# Patient Record
Sex: FEMALE | Race: BLACK OR AFRICAN AMERICAN | NOT HISPANIC OR LATINO | Employment: UNEMPLOYED | URBAN - METROPOLITAN AREA
[De-identification: names, ages, dates, MRNs, and addresses within clinical notes are randomized per-mention and may not be internally consistent; named-entity substitution may affect disease eponyms.]

---

## 2017-06-05 ENCOUNTER — HOSPITAL ENCOUNTER (EMERGENCY)
Facility: OTHER | Age: 22
Discharge: HOME | End: 2017-06-06
Attending: EMERGENCY MEDICINE
Payer: MEDICAID

## 2017-06-05 DIAGNOSIS — R51.9 NONINTRACTABLE HEADACHE, UNSPECIFIED CHRONICITY PATTERN, UNSPECIFIED HEADACHE TYPE: ICD-10-CM

## 2017-06-05 DIAGNOSIS — J06.9 VIRAL URI: Primary | ICD-10-CM

## 2017-06-05 DIAGNOSIS — N93.9 VAGINAL SPOTTING: ICD-10-CM

## 2017-06-05 PROCEDURE — 81025 URINE PREGNANCY TEST: CPT | Performed by: EMERGENCY MEDICINE

## 2017-06-05 PROCEDURE — 99284 EMERGENCY DEPT VISIT MOD MDM: CPT | Mod: 25

## 2017-06-06 VITALS
OXYGEN SATURATION: 98 % | SYSTOLIC BLOOD PRESSURE: 146 MMHG | HEIGHT: 67 IN | WEIGHT: 270 LBS | TEMPERATURE: 98 F | BODY MASS INDEX: 42.38 KG/M2 | DIASTOLIC BLOOD PRESSURE: 74 MMHG | HEART RATE: 86 BPM

## 2017-06-06 LAB
B-HCG UR QL: POSITIVE
BILIRUB UR QL STRIP: NEGATIVE
CLARITY UR: CLEAR
COLOR UR: YELLOW
CTP QC/QA: YES
GLUCOSE UR QL STRIP: NEGATIVE
HGB UR QL STRIP: ABNORMAL
KETONES UR QL STRIP: NEGATIVE
LEUKOCYTE ESTERASE UR QL STRIP: NEGATIVE
NITRITE UR QL STRIP: NEGATIVE
PH UR STRIP: 6 [PH] (ref 5–8)
PROT UR QL STRIP: NEGATIVE
SP GR UR STRIP: 1.01 (ref 1–1.03)
URN SPEC COLLECT METH UR: ABNORMAL
UROBILINOGEN UR STRIP-ACNC: NEGATIVE EU/DL

## 2017-06-06 PROCEDURE — 25000003 PHARM REV CODE 250: Performed by: PHYSICIAN ASSISTANT

## 2017-06-06 PROCEDURE — 81003 URINALYSIS AUTO W/O SCOPE: CPT

## 2017-06-06 PROCEDURE — 81025 URINE PREGNANCY TEST: CPT | Performed by: EMERGENCY MEDICINE

## 2017-06-06 RX ORDER — ACETAMINOPHEN 500 MG
1000 TABLET ORAL
Status: COMPLETED | OUTPATIENT
Start: 2017-06-06 | End: 2017-06-06

## 2017-06-06 RX ADMIN — ACETAMINOPHEN 1000 MG: 500 TABLET ORAL at 12:06

## 2017-06-06 NOTE — ED PROVIDER NOTES
Encounter Date: 6/5/2017       History     Chief Complaint   Patient presents with    Headache     Pt c/o HA since 7am, pt denies taking anything to aleviate symptoms    Vaginal Bleeding     Pt reports she is 10wks pregnant and had mild vaginal spotting today     Review of patient's allergies indicates:   Allergen Reactions    Pcn [penicillins]      Patient is 22-year-old female G1 PE 0 with unknown last menstrual period and 10w4d gestation by ultrasound who presents with complaints of single episode of vaginal spotting and headache today.  She reports headache was gradual in onset and has been persistent throughout the day.  She reports associated nasal congestion and mild sore throat and occasional cough.  She has not taken any medications to help with her symptoms.  She reports early this afternoon feeling pelvic pressure like she had A bowel movement.  Upon sitting on the toilet she states that she pushed and blood came out.  She reports just a few drips with no recurrence of bleeding.  She reports no abdominal pain following.  Currently she is not experiencing vaginal bleeding.  She did not call her OB/GYN she called 911 and came directly to the emergency department for evaluation.  She currently is experiencing improvement in headache without having taken any medications.  She denies fever, chills, nausea, vomiting, chest pain or shortness of breath.  She is currently unaccompanied in the ER.  Of note all of her previous prenatal care has [facility].          Past Medical History:   Diagnosis Date    Asthma     Lupus      History reviewed. No pertinent surgical history.  History reviewed. No pertinent family history.  Social History   Substance Use Topics    Smoking status: Never Smoker    Smokeless tobacco: Not on file    Alcohol use No     Review of Systems   Constitutional: Negative for fever.   HENT: Positive for congestion and sore throat.    Respiratory: Positive for cough. Negative for  shortness of breath.    Cardiovascular: Negative for chest pain.   Gastrointestinal: Negative for nausea.   Genitourinary: Positive for vaginal bleeding. Negative for dysuria.   Musculoskeletal: Negative for back pain.   Skin: Negative for rash.   Neurological: Positive for headaches. Negative for weakness.   Hematological: Does not bruise/bleed easily.       Physical Exam     Initial Vitals [06/05/17 2223]   BP Pulse Resp Temp SpO2   (!) 146/74 86 -- 99.1 °F (37.3 °C) 98 %     Physical Exam    Nursing note and vitals reviewed.  Constitutional: She appears well-developed and well-nourished. She is not diaphoretic. No distress.   Healthy appearing -American female in no acute distress or obvious pain.  She ambulates around emergency department with ease.  She makes good eye contact, speaks in clear full sentences and is cooperative.   HENT:   Head: Normocephalic and atraumatic.   Boggy nasal turbinates bilaterally  Posterior oropharynx erythema without edema or exudate  Uvula is midline and there is no trismus or lingual elevation  No anterior cervical lymphadenopathy   Eyes: Conjunctivae and EOM are normal. Pupils are equal, round, and reactive to light. Right eye exhibits no discharge. Left eye exhibits no discharge. No scleral icterus.   Neck: Normal range of motion. Neck supple.   Cardiovascular: Normal rate, regular rhythm and normal heart sounds. Exam reveals no gallop and no friction rub.    No murmur heard.  Pulmonary/Chest: Breath sounds normal. She has no wheezes. She has no rhonchi. She has no rales.   Abdominal: Soft. Bowel sounds are normal. There is no tenderness. There is no rebound and no guarding.   Benign abdomen.  No CVA tenderness to percussion.   Genitourinary:   Genitourinary Comments: Pelvic exam is deferred by patient   Musculoskeletal: Normal range of motion. She exhibits no edema or tenderness.   Lymphadenopathy:     She has no cervical adenopathy.   Neurological: She is alert and  oriented to person, place, and time. She has normal strength. No cranial nerve deficit or sensory deficit.   No cranial nerve deficits  No gait abnormalities   Skin: Skin is warm and dry. Capillary refill takes less than 2 seconds. No rash and no abscess noted. No erythema.   Psychiatric: She has a normal mood and affect. Her behavior is normal. Thought content normal.         ED Course   Procedures  Labs Reviewed   URINALYSIS   POCT URINE PREGNANCY             Medical Decision Making:   ED Management:  Urgent evaluation of 22-year-old female who presents with complaints most consistent with upper respiratory infection and sinus headache with on associated vaginal spotting.  She is afebrile, nontoxic appearing, hemodynamically stable.  Physical exam reveals benign abdomen with normal cardiopulmonary auscultation and no focal neuro deficits.  Patient refuses pelvic exam and she verbalizes understanding for the limitation she is placing on this workup.  I did review medical records from [facility] which revealed on May 10 patient was documented to have blood type is B+ beta [place] of [address] with a transvaginal ultrasound confirming intrauterine pregnancy to be 6 weeks and 3 days.  At this point patient tells me she is not currently bleeding and I do feel that she is safe for discharge without diagnostic lab values especially in the setting of stable vital signs and no evidence of acute anemia or dehydration.  I suspect her headache is related to HEENT inflammation likely of viral etiology.  Do not suspect acute bacterial infection requiring antibiotic treatment.  Transvaginal ultrasound obtained by me does reveal good fetal cardiac activity with heartbeat measured at approximately 148 bpm.  There is good spontaneous fetal movement.  I informed patient that this is reassuring  However the importance of follow-up with OB/GYN for complete evaluation is stressed to the patient.  She verbalizes understanding is amenable to plan.   She is placed on bleeding precautions and pelvic rest and is felt safe for discharge.                    ED Course     Clinical Impression:   The primary encounter diagnosis was Viral URI. Diagnoses of Nonintractable headache, unspecified chronicity pattern, unspecified headache type and Vaginal spotting were also pertinent to this visit.          Verna Clemente PA-C  06/06/17 0124

## 2019-09-21 ENCOUNTER — HOSPITAL ENCOUNTER (EMERGENCY)
Facility: OTHER | Age: 24
Discharge: HOME | End: 2019-09-21
Attending: EMERGENCY MEDICINE
Payer: MEDICAID

## 2019-09-21 VITALS
DIASTOLIC BLOOD PRESSURE: 75 MMHG | HEIGHT: 66 IN | BODY MASS INDEX: 47.09 KG/M2 | OXYGEN SATURATION: 98 % | SYSTOLIC BLOOD PRESSURE: 135 MMHG | TEMPERATURE: 98 F | WEIGHT: 293 LBS | RESPIRATION RATE: 18 BRPM | HEART RATE: 88 BPM

## 2019-09-21 DIAGNOSIS — M25.572 ACUTE LEFT ANKLE PAIN: Primary | ICD-10-CM

## 2019-09-21 DIAGNOSIS — Z20.2 ENCOUNTER FOR ASSESSMENT OF STD EXPOSURE: ICD-10-CM

## 2019-09-21 DIAGNOSIS — O92.79 PROLONGED LACTATION: ICD-10-CM

## 2019-09-21 DIAGNOSIS — R63.5 WEIGHT GAIN: ICD-10-CM

## 2019-09-21 LAB
B-HCG UR QL: NEGATIVE
BILIRUB UR QL STRIP: NEGATIVE
CLARITY UR: ABNORMAL
COLOR UR: YELLOW
CTP QC/QA: YES
GLUCOSE UR QL STRIP: NEGATIVE
HGB UR QL STRIP: NEGATIVE
KETONES UR QL STRIP: NEGATIVE
LEUKOCYTE ESTERASE UR QL STRIP: NEGATIVE
NITRITE UR QL STRIP: NEGATIVE
PH UR STRIP: 6 [PH] (ref 5–8)
PROT UR QL STRIP: NEGATIVE
SP GR UR STRIP: 1.02 (ref 1–1.03)
URN SPEC COLLECT METH UR: ABNORMAL
UROBILINOGEN UR STRIP-ACNC: NEGATIVE EU/DL

## 2019-09-21 PROCEDURE — 81025 URINE PREGNANCY TEST: CPT | Performed by: EMERGENCY MEDICINE

## 2019-09-21 PROCEDURE — 25000003 PHARM REV CODE 250: Performed by: EMERGENCY MEDICINE

## 2019-09-21 PROCEDURE — 99284 EMERGENCY DEPT VISIT MOD MDM: CPT

## 2019-09-21 PROCEDURE — 87491 CHLMYD TRACH DNA AMP PROBE: CPT

## 2019-09-21 PROCEDURE — 81003 URINALYSIS AUTO W/O SCOPE: CPT

## 2019-09-21 RX ORDER — LIDOCAINE 50 MG/G
PATCH TOPICAL
Qty: 30 PATCH | Refills: 0 | Status: SHIPPED | OUTPATIENT
Start: 2019-09-21 | End: 2020-10-30

## 2019-09-21 RX ORDER — LEVONORGESTREL 1.5 MG/1
1.5 TABLET ORAL ONCE
Qty: 1 TABLET | Refills: 0 | Status: SHIPPED | OUTPATIENT
Start: 2019-09-21 | End: 2020-10-30

## 2019-09-21 RX ORDER — NAPROXEN 500 MG/1
500 TABLET ORAL 2 TIMES DAILY PRN
Qty: 60 TABLET | Refills: 0 | Status: SHIPPED | OUTPATIENT
Start: 2019-09-21 | End: 2020-10-30

## 2019-09-21 RX ORDER — LEVONORGESTREL 1.5 MG/1
1.5 TABLET ORAL ONCE
Qty: 1 TABLET | Refills: 0 | COMMUNITY
Start: 2019-09-21 | End: 2019-09-21 | Stop reason: SDUPTHER

## 2019-09-21 RX ORDER — NAPROXEN 500 MG/1
500 TABLET ORAL
Status: COMPLETED | OUTPATIENT
Start: 2019-09-21 | End: 2019-09-21

## 2019-09-21 RX ADMIN — NAPROXEN 500 MG: 500 TABLET ORAL at 01:09

## 2019-09-21 NOTE — ED TRIAGE NOTES
"Pt on face time upon RN entry. Pt presents with multiple medical complaints. Pt states she injured to left ankle yesterday at work. States she was stepping off a ladder and missed a rung and stumbled back and has pain with bearing weight. No obvious deformities noted. Pt also reports she has unprotected sex with ex boyfriend last night and "wants to get checked out for that". States hx of trichomonas infection from the sex partner in the past. Pt also reports "smelly discharge" with itching for 3 weeks, reports hx of BV and lichen planus. Pt also reports recent weight gain of 60 lbs despite dieting. Pt staets she recently moved here and has not set up primary care. Pt is obese, well appearing otherwise, ptin NAD.   "

## 2019-09-21 NOTE — DISCHARGE INSTRUCTIONS
Call your primary care doctor to make the first available appointment.     Keep all your medical appointments.     Take your regular medication as prescribed. Contact your primary care provider before running out of medication, or for any problems obtaining them.    Do not drive or operate heavy machinery while taking opioid or muscle relaxing medications. Examples include norco, percocet, xanax, valium, flexeril.     Overuse or long term use of pain and sedating medication may lead to addiction, dependence, organ failure, family and work problems, legal problems, accidental overdose and death.    If you do not have health insurance, you probably qualify for heavily discounted rates:  Call [phone] ([facility] hotline) or go to [email or web address]    Your evaluation in the ED does not suggest any emergent or life threatening medical condition requiring admission or immediate intervention beyond that provided in the ED.   However, the signs of a serious problem sometimes take more time to appear.   RETURN TO THE ER if any of the following occur:    Weakness, dizziness, fainting, or loss of consciousness   Fever of 100.4ºF (38ºC) or higher  Any worse symptoms  Any new or concerning symptoms        In the [place] emergency conrtraception is available on the shelf without age restrictions to women and men. Look for Plan B One-Step, Take Action, Next Choice One-Dose, My Way or other generics in the family planning aisle.       Emergency contraceptive pills are stocked by all major pharmacy chains, but be sure to call ahead to make sure that EC is available in your pharmacy. Regulations on the sale of EC have changed frequently, so it can be quite confusing. Below is some information about how the different brands of EC are sold. If you want to use ainsley, call the pharmacy first to be sure that it is in stock.    Progestin-only EC (like Plan B One-Step and its generic forms Take Action, Next Choice One Dose and My Way) are  approved for unrestricted sale on store shelves. Anyone can buy it without needing to show ID. Plan B One-Step usually costs about $50, and the generics cost about $40.    If you want to use insurance to purchase EC, go to the pharmacy counter and ask for help.  You can order a generic form of Plan B One-Step at www.[employer] for $20 + $5 shipping. This site does not offer expedited shipping, so it's not meant for emergency use, but you can stock up and keep it on hand for future use.  ainsley is sold by prescription only, regardless of age. You can also order ainsley online at [employer] or [employer] for $67, including next-day shipping.

## 2019-09-21 NOTE — ED PROVIDER NOTES
"Encounter Date: 9/21/2019    SCRIBE #1 NOTE: I, Laura Blevins, am scribing for, and in the presence of, Dr. Lee.       History     Chief Complaint   Patient presents with    Ankle Pain     L ankle pain after stepping off a ladder    Vaginal Discharge     white vaginal discharge, and itching.      Time seen by provider: 12:23 AM    This is a 24 y.o. female who presents with complaint of left ankle pain s/p stumbling down a 3 ft ladder over 24 hours ago. She was ambulatory afterwards.  Pain is moderate in degree, constant, worse with ambulation and bearing weight.  She reports associated tingling radiating from the ankle to the toes, but denies numbness. She has not taken any analgesics or attempted any other remedy for the pain. She reports remote history of left ankle sprain.    Patient is also concerned for STD. She had intercourse over 24 hours ago after heavily drinking and cannot recall if she used protection.  She feels the encounter was consensual.  My history is different than that at triage as she currently denies any  symptoms including dysuria, vaginal discharge, or vaginal itching. She reports hx of BV. She also reports gaining 60 lbs in 3 months despite dieting with a history of her last several years of very large variations of her way every few months ("I can go from weighing 360 to 180 lbs in 4 months.") she denies any changes in her diet or environment to explain these swings.    The history is provided by the patient and medical records.     Review of patient's allergies indicates:   Allergen Reactions    Pcn [penicillins]      Past Medical History:   Diagnosis Date    Asthma     Lichen planus     Lupus      History reviewed. No pertinent surgical history.  History reviewed. No pertinent family history.  Social History     Tobacco Use    Smoking status: Never Smoker   Substance Use Topics    Alcohol use: No    Drug use: No     Review of Systems  ROS: As per HPI and below:   General: " "Notes weight change. No fever. No chills. No generalized weakness.   HENT: No sore throat. No rhinorrhea. No facial pain. No facial swelling.   Eyes: No visual changes. No eye pain.   Cardiovascular: No chest pain.   Respiratory: No dyspnea. No cough.   GI: No abdominal pain. No nausea. No vomiting. No diarrhea.   : No dysuria. No hematuria.   Skin: No rashes.  Neuro: No focal deficits. No focal weakness. No headache. No syncope.  Endo: Persistent lactation, despite not breastfeeding  Musculoskeletal: Notes left ankle pain. No swelling.    Psych: No acute changes.  All other systems negative.     Physical Exam     Initial Vitals [09/21/19 0005]   BP Pulse Resp Temp SpO2   (!) 147/82 102 16 98.2 °F (36.8 °C) 97 %      MAP       --         Physical Exam  /75   Pulse 88   Temp 97.8 °F (36.6 °C) (Oral)   Resp 18   Ht 5' 6" (1.676 m)   Wt (!) 140.2 kg (309 lb)   SpO2 98%   Breastfeeding? Unknown   BMI 49.87 kg/m²     Nursing note and vitals reviewed.  Constitutional: AAOx3. Well appearing. Obese.   Eyes: EOMI. No discharge. Anicteric.  HENT:   Mouth/Throat: Oropharynx is clear and moist. Uvula midline.   Neck: Normal range of motion. Neck supple.    Cardiovascular: Normal rate  Pulmonary/Chest: No respiratory distress.   Abdominal: No distension   Musculoskeletal: Normal range of motion.   Left Ankle: No edema, skin intact, FROM, no focal tenderness at anterior ankle, no 5th metatarsal tenderness, no focal midfoot tenderness, no tenderness along distal 6cm of tibia or fibula, mild lateral malleolar point tenderness, no medial malleolar point tenderness, no pain on tibial press, no proximal tibial tenderness, neurovascularly intact distally. Compartments soft. No knee joint tenderness.  Able to bear weight. Ipsilateral knee with FROM, no focal tenderness.   Neurological: GCS15. Alert and oriented to person, place, and time. No gross cranial nerve II-XII, focal strength, or light touch deficit. Steady gait. "   Skin: Skin is warm and dry.   Psychiatric: Behavior is normal. Judgment normal.      ED Course   Procedures  Labs Reviewed   URINALYSIS, REFLEX TO URINE CULTURE - Abnormal; Notable for the following components:       Result Value    Appearance, UA Hazy (*)     All other components within normal limits    Narrative:     Preferred Collection Type->Urine, Clean Catch   POCT URINE PREGNANCY - Normal   C. TRACHOMATIS/N. GONORRHOEAE BY AMP DNA          Imaging Results    None          Medical Decision Making:   History:   Old Medical Records: I decided to obtain old medical records.  Clinical Tests:   Lab Tests: Ordered and Reviewed            Scribe Attestation:   Scribe #1: I performed the above scribed service and the documentation accurately describes the services I performed. I attest to the accuracy of the note.    Attending Attestation:           Physician Attestation for Scribe:  Physician Attestation Statement for Scribe #1: I, Dr. Lee, reviewed documentation, as scribed by Laura Blevins in my presence, and it is both accurate and complete.                 ED Course as of Sep 21 2104   Sat Sep 21, 2019   0043 Patient is a 24-year-old female with obesity, who presents for evaluation of left ankle pain after misstep off a ladder 1 day ago.   Patient also concern for possible STD exposure after possible unprotected sex while intoxicated yesterday.  She feels that intercourse close consensual.  Patient is also concerned by very large variations in a week over the last 2 years, including recent approximately 60 lb weight gain in the last few months.  On exam patient morbidly obese, has mild left lateral malleolar tenderness, able to bear weight.   I doubt ankle fracture or dislocation.  Discussed with the patient that testing today from a sexual encounter yesterday would not be diagnostic for any new STDs, however offered her screening for gonorrhea, chlamydia, Trichomonas.  I instructed her to follow up with PCP  for further STD testing.  I also offered the patient a prescription for emergency contraceptive.  Regarding her fluctuating weight, and persistent lactation, I instructed the patient to follow up with PCP, specifically for endocrine testing.  She stated understanding.  I discussed with patient and/or guardian/caretaker that this evaluation in the ED does not suggest any emergent or life threatening medical condition requiring admission or immediate intervention beyond what was provided in the ED. Regardless, an unremarkable evaluation in the ED does not preclude the development or presence of a serious or life threatening condition. As such, patient was instructed to return immediately for any worsening or change in current symptoms.     I had a detailed discussion with patient  and/or guardian/caretaker regarding findings, plan, return precautions, importance of medication adherence, need to follow-up with a PCP and specialist. All questions answered.     Note was created using voice recognition software. It may have occasional typographical errors not identified and edited despite initial review prior to signing.        [RC]      ED Course User Index  [RC] Matt Lee MD     Clinical Impression:     1. Acute left ankle pain    2. Weight gain    3. Encounter for assessment of STD exposure    4. Prolonged lactation                                 Matt Lee MD  09/21/19 1743

## 2019-09-22 LAB
C TRACH DNA SPEC QL NAA+PROBE: NOT DETECTED
N GONORRHOEA DNA SPEC QL NAA+PROBE: NOT DETECTED

## 2020-10-29 DIAGNOSIS — Z34.90 PREGNANCY, UNSPECIFIED GESTATIONAL AGE: Primary | ICD-10-CM

## 2020-10-30 ENCOUNTER — PROCEDURE VISIT (OUTPATIENT)
Dept: OBSTETRICS AND GYNECOLOGY | Facility: CLINIC | Age: 25
End: 2020-10-30
Payer: MEDICAID

## 2020-10-30 ENCOUNTER — INITIAL PRENATAL (OUTPATIENT)
Dept: OBSTETRICS AND GYNECOLOGY | Facility: CLINIC | Age: 25
End: 2020-10-30
Payer: MEDICAID

## 2020-10-30 VITALS
WEIGHT: 293 LBS | DIASTOLIC BLOOD PRESSURE: 83 MMHG | BODY MASS INDEX: 51 KG/M2 | HEART RATE: 96 BPM | SYSTOLIC BLOOD PRESSURE: 139 MMHG

## 2020-10-30 DIAGNOSIS — Z34.90 PREGNANCY, UNSPECIFIED GESTATIONAL AGE: ICD-10-CM

## 2020-10-30 DIAGNOSIS — Z72.0 TOBACCO USE: ICD-10-CM

## 2020-10-30 DIAGNOSIS — B00.9 HSV INFECTION: ICD-10-CM

## 2020-10-30 DIAGNOSIS — R56.9 SEIZURES: ICD-10-CM

## 2020-10-30 DIAGNOSIS — Z3A.12 12 WEEKS GESTATION OF PREGNANCY: ICD-10-CM

## 2020-10-30 DIAGNOSIS — M32.9 SYSTEMIC LUPUS ERYTHEMATOSUS, UNSPECIFIED SLE TYPE, UNSPECIFIED ORGAN INVOLVEMENT STATUS: ICD-10-CM

## 2020-10-30 DIAGNOSIS — F12.10 TETRAHYDROCANNABINOL (THC) USE DISORDER, MILD, ABUSE: ICD-10-CM

## 2020-10-30 DIAGNOSIS — O09.91 SUPERVISION OF HIGH RISK PREGNANCY IN FIRST TRIMESTER: Primary | ICD-10-CM

## 2020-10-30 DIAGNOSIS — Z98.891 H/O: C-SECTION: ICD-10-CM

## 2020-10-30 PROBLEM — O09.90 SUPERVISION OF HIGH-RISK PREGNANCY: Status: ACTIVE | Noted: 2020-10-30

## 2020-10-30 PROBLEM — E66.9 OBESE: Status: ACTIVE | Noted: 2020-10-30

## 2020-10-30 PROBLEM — E66.01 CLASS 3 SEVERE OBESITY DUE TO EXCESS CALORIES WITHOUT SERIOUS COMORBIDITY WITH BODY MASS INDEX (BMI) OF 50.0 TO 59.9 IN ADULT: Status: ACTIVE | Noted: 2020-10-30

## 2020-10-30 PROBLEM — J45.20 MILD INTERMITTENT ASTHMA WITHOUT COMPLICATION: Status: ACTIVE | Noted: 2020-10-30

## 2020-10-30 PROCEDURE — 99203 OFFICE O/P NEW LOW 30 MIN: CPT | Mod: TH,S$GLB,, | Performed by: STUDENT IN AN ORGANIZED HEALTH CARE EDUCATION/TRAINING PROGRAM

## 2020-10-30 PROCEDURE — 76801 OB US < 14 WKS SINGLE FETUS: CPT | Mod: S$GLB,,, | Performed by: STUDENT IN AN ORGANIZED HEALTH CARE EDUCATION/TRAINING PROGRAM

## 2020-10-30 PROCEDURE — 76801 PR US, OB <14WKS, TRANSABD, SINGLE GESTATION: ICD-10-PCS | Mod: S$GLB,,, | Performed by: STUDENT IN AN ORGANIZED HEALTH CARE EDUCATION/TRAINING PROGRAM

## 2020-10-30 PROCEDURE — 99203 PR OFFICE/OUTPT VISIT, NEW, LEVL III, 30-44 MIN: ICD-10-PCS | Mod: TH,S$GLB,, | Performed by: STUDENT IN AN ORGANIZED HEALTH CARE EDUCATION/TRAINING PROGRAM

## 2020-10-30 NOTE — PROGRESS NOTES
CC: Initial OB visit    HPI:  25 y.o. at 12w4d with EDC of 5/10/2021, by 12w Ultrasound  Complains of nothing today. Pt reports a h/o seizures, last one 1 month ago, she has not been seen by Neurology and does not take any meds.     ROS:  Constitutional: fever Negative; chills Negative  Skin: itching Negative  HEENT: sinus pain Negative, congestion Negative  Eyes: blurred vision Negative; double vision Negative  Cardiovascular: chest pain Negative; palpitations Negative  Respiratory: cough Negative; shortness of breath Negative  GI: heartburn Negative; nausea/vomiting Negative  : dysuria Negative  Musculoskeletal: myalgia Negative  Neurological: headaches Negative  Psych: depression Negative; anxiety Negative    PMH: SLE, seizures, asthma, lichen planus  PSH: C/s  Meds: none  ALL: PCN - hives and swelling  OB Hx: : 37w C/s after failed IOL, HSV, M [id number]   G2: current  SOC: + tob and THC use, denies etoh use  FH: denies family history of congenital defects, mental retardation, twins, cystic fibrosis, Down's syndrome, sickle cell, NTD's, cleft lip/palate, cardiac defects, abdominal wall defects, GYN cancers   GYN Hx: past history: HSV, denies other STD's, Negative History of Abnormal PAP    PHYSICAL EXAM  Prenatal Vitals  BP: 139/83  Weight: (!) 143.3 kg (316 lb)  Urine Glucose: Negative  Urine Albumin: Negative    Body mass index is 51 kg/m².    Wt Readings from Last 3 Encounters:   10/30/20 (!) 143.3 kg (316 lb)   19 (!) 140.2 kg (309 lb)   17 122.5 kg (270 lb)     General: NAD, well developed, well nourished, obese  Psych: alert and oriented to person, time and place, normal affect  HEENT: normocephalic, atraumatic  Non labored breathing  Gravid, soft, NT  Skin: warm, dry  Neuro: normal gait, gross motor function intact  Pt deferred breast and pelvic exams today  No cyanosis, clubbing or edema    FHT's: present on US    ASSESSMENT: Patient is a 25 y.o.  at 12w4d  with  Patient Active Problem List   Diagnosis    Class 3 severe obesity due to excess calories without serious comorbidity with body mass index (BMI) of 50.0 to 59.9 in adult    Supervision of high risk pregnancy in first trimester    SLE (systemic lupus erythematosus)    Seizures    Mild intermittent asthma without complication    Tobacco use    Tetrahydrocannabinol (THC) use disorder, mild, abuse    HSV     H/O:      PLAN:  Will get SSA and SSB, CMP and 24 hr urine protein today for h/o SLE  Pt referred to neurology for seizure workup  Counseled on weight to be gained during pregnancy  Early O'sull today  PNL, GC/CZ, PAP - pt deferred until next visit  Pt counseled on tobacco and THS use in pregnancy, pt states she will quit  Dating scan reviewed  Pt to be seen by MFM   PNV, Iron  Pain, Fever, Bleeding Precautions  WILLIAN, RICK, PreE precautions  Encouraged Breast feeding  F/U in 2 weeks

## 2020-11-02 LAB
AMPHETAMINES (500): NEGATIVE NG/ML
BARBITURATES (200): NEGATIVE NG/ML
BENZODIAZEPINES: NEGATIVE NG/ML
COCAINE (150): ABNORMAL NG/ML
MARIJUANA, THC (50): NEGATIVE NG/ML
METHADONE: NEGATIVE NG/ML
OPIATES: NEGATIVE NG/ML
OXYCODONE: NEGATIVE NG/ML
PH: 5.7 (ref 4.5–8)
PHENCYCLIDINE (25): NEGATIVE NG/ML
URINE CREATININE D/S: 214.3 MG/DL

## 2020-11-03 LAB
CHLAMYDIA: NEGATIVE
GONORRHEA: NEGATIVE
SOURCE: NORMAL
SOURCE: NORMAL
TRICHOMONAS AMPLIFIED: NEGATIVE

## 2020-11-18 NOTE — PROGRESS NOTES
Dating US reviewed:  12w4d fetus, uterus wnl, FHTs present, anatomy scan at 22 weeks     See viewpoint

## 2020-12-04 ENCOUNTER — ROUTINE PRENATAL (OUTPATIENT)
Dept: OBSTETRICS AND GYNECOLOGY | Facility: CLINIC | Age: 25
End: 2020-12-04
Payer: MEDICAID

## 2020-12-04 DIAGNOSIS — O99.322 COCAINE ABUSE AFFECTING PREGNANCY IN SECOND TRIMESTER: ICD-10-CM

## 2020-12-04 DIAGNOSIS — F14.10 COCAINE ABUSE AFFECTING PREGNANCY IN SECOND TRIMESTER: ICD-10-CM

## 2020-12-04 DIAGNOSIS — O09.91 SUPERVISION OF HIGH RISK PREGNANCY IN FIRST TRIMESTER: Primary | ICD-10-CM

## 2020-12-04 DIAGNOSIS — Z3A.17 17 WEEKS GESTATION OF PREGNANCY: ICD-10-CM

## 2020-12-04 PROCEDURE — 99499 UNLISTED E&M SERVICE: CPT | Mod: S$GLB,,, | Performed by: STUDENT IN AN ORGANIZED HEALTH CARE EDUCATION/TRAINING PROGRAM

## 2020-12-04 PROCEDURE — 99499 NO LOS: ICD-10-PCS | Mod: S$GLB,,, | Performed by: STUDENT IN AN ORGANIZED HEALTH CARE EDUCATION/TRAINING PROGRAM

## 2020-12-04 NOTE — PROGRESS NOTES
CC: Follow-Up OB    HPI:   25 y.o.  at 17w4d with EDC of 5/10/2021, by 12w Ultrasound, here for her follow up OB visit. Pt very agitated today, became very combative and verbally abusive when she was told she was not getting an US today. Pt heard in the room screaming and cussing. Administration was called to be present. Pt was told she would not be seen if she is behaving this way. She decided to leave stating she would be transferring to another facility. Pt then left the clinic. She later returned cussing at clinic staff. Police were called for assistance. She was informed she will not be seen in this clinic for her pregnancy and that she will have to be seen by another doctor. Pt is dismissed from this clinic.

## 2021-08-26 NOTE — ED NOTES
"Pt name and  verified and correct.   Pt to ED c/o headache 10 out of 10 since 0700 and vaginal bleeding during pregnancy x 1 day- pt reports she is 10 weeks pregnant. Pt denies taking pain medication for headache. Pt reports N/V today and pelvic fullness but states "I always have this uncomfortable feeling when pregnant". Pt denies Cp, SOB, cough, congestion, dysuria. Pt AAOx4 and appropriate at this time. Respirations even and unlabored. No acute distress noted.   "
PA at bedside for assessment  
Provided pt SHELLY rollins at bedside with US for fetal activity. Pt requesting to not have pelvic or blood work. PA reports okay to dc bloodwork if able to obtain bloodwork from [facility].   
Pt given sandwich per PA.  
Pt in bathroom to provide urine sample.  
Unable to obtain FHT, PA informed. Pt requesting something to eat.   
160.02